# Patient Record
Sex: FEMALE | Race: WHITE | Employment: OTHER | URBAN - NONMETROPOLITAN AREA
[De-identification: names, ages, dates, MRNs, and addresses within clinical notes are randomized per-mention and may not be internally consistent; named-entity substitution may affect disease eponyms.]

---

## 2019-05-24 ENCOUNTER — OUTSIDE SERVICES (OUTPATIENT)
Dept: FAMILY MEDICINE CLINIC | Age: 84
End: 2019-05-24
Payer: MEDICARE

## 2019-05-24 VITALS
OXYGEN SATURATION: 93 % | DIASTOLIC BLOOD PRESSURE: 55 MMHG | SYSTOLIC BLOOD PRESSURE: 110 MMHG | TEMPERATURE: 97.7 F | WEIGHT: 181.8 LBS | RESPIRATION RATE: 18 BRPM | HEART RATE: 67 BPM

## 2019-05-24 DIAGNOSIS — I10 ESSENTIAL HYPERTENSION: Primary | ICD-10-CM

## 2019-05-24 DIAGNOSIS — M47.818 OSTEOARTHRITIS OF SPINE WITHOUT MYELOPATHY OR RADICULOPATHY, SACRAL AND SACROCOCCYGEAL REGION: ICD-10-CM

## 2019-05-24 DIAGNOSIS — E78.2 MIXED HYPERLIPIDEMIA: ICD-10-CM

## 2019-05-24 DIAGNOSIS — F32.89 OTHER DEPRESSION: ICD-10-CM

## 2019-05-24 DIAGNOSIS — I35.0 NONRHEUMATIC AORTIC VALVE STENOSIS: ICD-10-CM

## 2019-05-24 PROBLEM — F32.A DEPRESSION: Status: ACTIVE | Noted: 2019-05-24

## 2019-05-24 RX ORDER — NYSTATIN 100000 [USP'U]/G
POWDER TOPICAL 2 TIMES DAILY
COMMUNITY

## 2019-05-24 RX ORDER — DOCUSATE SODIUM 100 MG/1
100 CAPSULE, LIQUID FILLED ORAL 3 TIMES DAILY PRN
COMMUNITY
End: 2019-08-26

## 2019-05-24 RX ORDER — SENNA AND DOCUSATE SODIUM 50; 8.6 MG/1; MG/1
2 TABLET, FILM COATED ORAL DAILY PRN
COMMUNITY
End: 2019-08-26

## 2019-05-24 RX ORDER — ACETAMINOPHEN 325 MG/1
325 TABLET ORAL 2 TIMES DAILY
COMMUNITY

## 2019-05-24 RX ORDER — LISINOPRIL 40 MG/1
40 TABLET ORAL DAILY
COMMUNITY

## 2019-05-24 RX ORDER — COVID-19 ANTIGEN TEST
1 KIT MISCELLANEOUS DAILY PRN
COMMUNITY

## 2019-05-24 RX ORDER — SERTRALINE HYDROCHLORIDE 100 MG/1
50 TABLET, FILM COATED ORAL DAILY
COMMUNITY

## 2019-05-24 RX ORDER — MAGNESIUM HYDROXIDE/ALUMINUM HYDROXICE/SIMETHICONE 120; 1200; 1200 MG/30ML; MG/30ML; MG/30ML
30 SUSPENSION ORAL EVERY 4 HOURS PRN
COMMUNITY
End: 2019-08-26

## 2019-05-24 SDOH — HEALTH STABILITY: MENTAL HEALTH: HOW OFTEN DO YOU HAVE A DRINK CONTAINING ALCOHOL?: NEVER

## 2019-05-24 ASSESSMENT — ENCOUNTER SYMPTOMS
ABDOMINAL DISTENTION: 0
CHEST TIGHTNESS: 0
COUGH: 0
SORE THROAT: 0
NAUSEA: 0
DIARRHEA: 0
RHINORRHEA: 0
SHORTNESS OF BREATH: 0
WHEEZING: 0
VOMITING: 0
CONSTIPATION: 0
EYE REDNESS: 0

## 2019-05-24 NOTE — PROGRESS NOTES
Kalyn Dodge is a 80 y.o. female who presents today for her medical conditions/complaints as noted below. Chief Complaint   Patient presents with    Other     Quarterly visit for review of chronic health conditions    Hypertension           HPI:     HPI     Jason Ghosh is seen in her room. Her mood has declined recently with the passing of one of her friends. She remains in her room more often. She has had a recent increase in her Zoloft and we are continuing to monitor this. Will add Psych services to see if this will help her. She is pleasant and conversant this am. She denies pain. Smiles with this writer.      Past Medical History:   Diagnosis Date    Aortic valve stenosis     Dementia with behavioral disturbance     History of pelvic fracture     HLD (hyperlipidemia)     HTN (hypertension)     MDD (major depressive disorder)     Polyarthritis     Renal colic     VRE (vancomycin resistant enterococcus) culture positive       Past Surgical History:   Procedure Laterality Date    DILATION AND CURETTAGE OF UTERUS      KNEE SURGERY      TONSILLECTOMY         Family History   Problem Relation Age of Onset    Heart Disease Mother        Social History     Tobacco Use    Smoking status: Never Smoker    Smokeless tobacco: Never Used   Substance Use Topics    Alcohol use: Never     Frequency: Never      Current Outpatient Medications   Medication Sig Dispense Refill    acetaminophen (TYLENOL) 325 MG tablet Take 325 mg by mouth 2 times daily      Naproxen Sodium (ALEVE) 220 MG CAPS Take 1 tablet by mouth daily as needed for Pain      aspirin 81 MG tablet Take 81 mg by mouth daily      lisinopril (PRINIVIL;ZESTRIL) 40 MG tablet Take 40 mg by mouth daily      aluminum & magnesium hydroxide-simethicone (MAALOX) 200-200-20 MG/5ML SUSP suspension Take 30 mLs by mouth every 4 hours as needed for Indigestion      nystatin (MYCOSTATIN) 859190 UNIT/GM powder Apply topically 2 times daily Apply topically 4 times daily.      sennosides-docusate sodium (SENOKOT-S) 8.6-50 MG tablet Take 2 tablets by mouth daily as needed for Constipation      docusate sodium (COLACE) 100 MG capsule Take 100 mg by mouth 3 times daily as needed for Constipation      sertraline (ZOLOFT) 100 MG tablet Take 100 mg by mouth daily       No current facility-administered medications for this visit. No Known Allergies    Health Maintenance   Topic Date Due    Pneumococcal 65+ years Vaccine (1 of 2 - PCV13) 02/01/1996    Flu vaccine (Season Ended) 09/01/2019       Subjective:      Review of Systems   Constitutional: Positive for activity change, appetite change and fatigue. Negative for chills, fever and unexpected weight change. HENT: Positive for hearing loss. Negative for congestion, rhinorrhea and sore throat. Eyes: Positive for visual disturbance. Negative for redness. Respiratory: Negative for cough, chest tightness, shortness of breath and wheezing. Cardiovascular: Negative for chest pain and leg swelling. Gastrointestinal: Negative for abdominal distention, constipation, diarrhea, nausea and vomiting. Endocrine: Negative for polydipsia and polyuria. Genitourinary: Negative for dysuria, frequency and hematuria. Musculoskeletal: Positive for gait problem. Negative for arthralgias and myalgias. Skin: Negative for pallor, rash and wound. Allergic/Immunologic: Negative for environmental allergies and immunocompromised state. Neurological: Negative for dizziness, light-headedness and headaches. Hematological: Does not bruise/bleed easily. Psychiatric/Behavioral: Positive for confusion and dysphoric mood. Negative for sleep disturbance. The patient is not nervous/anxious. Objective:     Physical Exam   Constitutional: She appears well-developed and well-nourished. No distress. HENT:   Head: Normocephalic and atraumatic.    Right Ear: External ear normal.   Left Ear: External ear normal.   Nose: Nose normal. myelopathy or radiculopathy, sacral and sacrococcygeal region     5. Other depression         Plan:     1. HTN- Chronic and stable. Continue Lisinopril and monitor blood pressures. 2. Aortic stenosis- Will continue to monitor for s/s. States she is not having surgery. Denies dizziness. No falls and denies increased shortness of air except on exertion. Continue to monitor. Labs stable. 3. HLD- Chronic and stable. Statin discontinued. Continue Heart healthy diet and ASA. 4. OA with hx of fx- Family does not desire any further testing. Will continue to monitor. 5. Depression- Mood declining since she has had a friend pass away. Recent increase in zoloft. Will add Psychology services.       Electronically signed by NORBERTO Reyes CNP on 5/24/2019 at 6:12 PM

## 2019-08-26 ENCOUNTER — OUTSIDE SERVICES (OUTPATIENT)
Dept: FAMILY MEDICINE CLINIC | Age: 84
End: 2019-08-26
Payer: MEDICARE

## 2019-08-26 VITALS
HEART RATE: 62 BPM | SYSTOLIC BLOOD PRESSURE: 120 MMHG | RESPIRATION RATE: 16 BRPM | OXYGEN SATURATION: 95 % | WEIGHT: 137.7 LBS | TEMPERATURE: 97.9 F | DIASTOLIC BLOOD PRESSURE: 72 MMHG

## 2019-08-26 DIAGNOSIS — E78.2 MIXED HYPERLIPIDEMIA: ICD-10-CM

## 2019-08-26 DIAGNOSIS — I10 ESSENTIAL HYPERTENSION: Primary | ICD-10-CM

## 2019-08-26 DIAGNOSIS — F32.89 OTHER DEPRESSION: ICD-10-CM

## 2019-08-26 DIAGNOSIS — I35.0 NONRHEUMATIC AORTIC VALVE STENOSIS: ICD-10-CM

## 2019-08-26 DIAGNOSIS — M47.818 OSTEOARTHRITIS OF SPINE WITHOUT MYELOPATHY OR RADICULOPATHY, SACRAL AND SACROCOCCYGEAL REGION: ICD-10-CM

## 2019-08-26 RX ORDER — MIRTAZAPINE 15 MG/1
7.5 TABLET, FILM COATED ORAL NIGHTLY
COMMUNITY
End: 2019-10-28

## 2019-08-26 ASSESSMENT — ENCOUNTER SYMPTOMS
RHINORRHEA: 0
DIARRHEA: 0
WHEEZING: 0
EYE REDNESS: 0
SHORTNESS OF BREATH: 0
NAUSEA: 0
CONSTIPATION: 0
COUGH: 0
CHEST TIGHTNESS: 0
ABDOMINAL DISTENTION: 0
SORE THROAT: 0
VOMITING: 0

## 2019-08-26 NOTE — PROGRESS NOTES
is clear and moist and mucous membranes are normal.   Eyes: Conjunctivae and EOM are normal. Right eye exhibits no discharge. Left eye exhibits no discharge. No scleral icterus. Neck: Neck supple. No JVD present. No thyromegaly present. Cardiovascular: Normal rate and intact distal pulses. A regularly irregular rhythm present. Murmur heard. Pulmonary/Chest: Effort normal and breath sounds normal. No stridor. No respiratory distress. She has no wheezes. She has no rales. Abdominal: Soft. Bowel sounds are normal. She exhibits no distension. There is no tenderness. Musculoskeletal: Normal range of motion. She exhibits no edema or deformity. Right shoulder: She exhibits no tenderness, no bony tenderness and no pain. Left shoulder: She exhibits no tenderness, no bony tenderness and no pain. Cervical back: She exhibits no tenderness, no bony tenderness and no pain. Thoracic back: She exhibits no tenderness, no bony tenderness, no pain and no spasm. Lumbar back: She exhibits no tenderness, no bony tenderness and no pain. Lymphadenopathy:     She has no cervical adenopathy. Right: No supraclavicular adenopathy present. Left: No supraclavicular adenopathy present. Neurological: She is alert. She is disoriented (oriented to person and place). She displays no atrophy. She exhibits normal muscle tone. She displays no seizure activity. Skin: Skin is warm, dry and intact. No rash noted. No erythema. Psychiatric: She has a normal mood and affect. Her speech is normal and behavior is normal. Cognition and memory are impaired. She expresses impulsivity. Nursing note and vitals reviewed. /72   Pulse 62   Temp 97.9 °F (36.6 °C)   Resp 16   Wt 137 lb 11.2 oz (62.5 kg)   SpO2 95%     Assessment:       Diagnosis Orders   1. Essential hypertension     2. Nonrheumatic aortic valve stenosis     3. Mixed hyperlipidemia     4.  Osteoarthritis of spine without

## 2019-10-28 ENCOUNTER — OUTSIDE SERVICES (OUTPATIENT)
Dept: FAMILY MEDICINE CLINIC | Age: 84
End: 2019-10-28
Payer: MEDICARE

## 2019-10-28 VITALS
RESPIRATION RATE: 16 BRPM | DIASTOLIC BLOOD PRESSURE: 94 MMHG | OXYGEN SATURATION: 95 % | HEART RATE: 67 BPM | TEMPERATURE: 97.9 F | SYSTOLIC BLOOD PRESSURE: 161 MMHG | WEIGHT: 145.1 LBS

## 2019-10-28 DIAGNOSIS — R22.0 MANDIBULAR SWELLING: ICD-10-CM

## 2019-10-28 DIAGNOSIS — I10 ESSENTIAL HYPERTENSION: Primary | ICD-10-CM

## 2019-10-28 RX ORDER — AMLODIPINE BESYLATE 5 MG/1
5 TABLET ORAL DAILY
COMMUNITY

## 2019-10-28 ASSESSMENT — ENCOUNTER SYMPTOMS
NAUSEA: 0
CONSTIPATION: 0
VOMITING: 0
SHORTNESS OF BREATH: 0
SORE THROAT: 0
TROUBLE SWALLOWING: 0
COUGH: 0
SINUS PRESSURE: 0
RHINORRHEA: 0
FACIAL SWELLING: 1
WHEEZING: 0
DIARRHEA: 0

## 2020-01-01 ENCOUNTER — OUTSIDE SERVICES (OUTPATIENT)
Dept: FAMILY MEDICINE CLINIC | Age: 85
End: 2020-01-01
Payer: MEDICARE

## 2020-01-01 VITALS
SYSTOLIC BLOOD PRESSURE: 104 MMHG | DIASTOLIC BLOOD PRESSURE: 59 MMHG | RESPIRATION RATE: 16 BRPM | HEART RATE: 67 BPM | TEMPERATURE: 98 F | OXYGEN SATURATION: 96 % | WEIGHT: 140.6 LBS

## 2020-01-01 VITALS
WEIGHT: 137.4 LBS | OXYGEN SATURATION: 95 % | HEART RATE: 64 BPM | TEMPERATURE: 97.6 F | RESPIRATION RATE: 16 BRPM | DIASTOLIC BLOOD PRESSURE: 61 MMHG | SYSTOLIC BLOOD PRESSURE: 110 MMHG

## 2020-01-01 VITALS
RESPIRATION RATE: 18 BRPM | TEMPERATURE: 97.1 F | DIASTOLIC BLOOD PRESSURE: 74 MMHG | SYSTOLIC BLOOD PRESSURE: 135 MMHG | HEART RATE: 58 BPM | WEIGHT: 153.6 LBS | OXYGEN SATURATION: 98 %

## 2020-01-01 VITALS
SYSTOLIC BLOOD PRESSURE: 99 MMHG | HEART RATE: 75 BPM | RESPIRATION RATE: 16 BRPM | WEIGHT: 142.2 LBS | OXYGEN SATURATION: 94 % | TEMPERATURE: 97.7 F | DIASTOLIC BLOOD PRESSURE: 55 MMHG

## 2020-01-01 PROCEDURE — 99490 CHRNC CARE MGMT STAFF 1ST 20: CPT | Performed by: NURSE PRACTITIONER

## 2020-01-01 PROCEDURE — 99309 SBSQ NF CARE MODERATE MDM 30: CPT | Performed by: NURSE PRACTITIONER

## 2020-01-01 PROCEDURE — 99490 CHRNC CARE MGMT STAFF 1ST 20: CPT | Performed by: FAMILY MEDICINE

## 2020-01-01 PROCEDURE — 99306 1ST NF CARE HIGH MDM 50: CPT | Performed by: FAMILY MEDICINE

## 2020-01-01 RX ORDER — LORAZEPAM 0.5 MG/1
0.5 TABLET ORAL
Qty: 90 TABLET | Refills: 0 | Status: SHIPPED | OUTPATIENT
Start: 2020-01-01 | End: 2020-12-11

## 2020-01-01 ASSESSMENT — ENCOUNTER SYMPTOMS
NAUSEA: 0
CHEST TIGHTNESS: 0
RHINORRHEA: 0
EYE REDNESS: 0
ABDOMINAL DISTENTION: 0
SHORTNESS OF BREATH: 0
VOMITING: 0
COUGH: 0
WHEEZING: 0
CONSTIPATION: 0
SHORTNESS OF BREATH: 0
DIARRHEA: 0
SORE THROAT: 0

## 2020-06-22 PROBLEM — F03.918 DEMENTIA WITH BEHAVIORAL DISTURBANCE: Status: ACTIVE | Noted: 2020-01-01

## 2020-06-22 PROBLEM — N39.45 CONTINUOUS LEAKAGE OF URINE: Status: ACTIVE | Noted: 2020-01-01

## 2020-06-22 NOTE — PROGRESS NOTES
Out    Hepatitis B vaccine  Aged Out    Hib vaccine  Aged Out    Meningococcal (ACWY) vaccine  Aged Out       Subjective:      Review of Systems   Constitutional: Negative for chills, fatigue, fever and unexpected weight change. HENT: Positive for hearing loss. Negative for congestion, rhinorrhea and sore throat. Eyes: Positive for visual disturbance. Negative for redness. Respiratory: Negative for cough, chest tightness, shortness of breath and wheezing. Cardiovascular: Negative for chest pain and leg swelling. Gastrointestinal: Negative for abdominal distention, constipation, diarrhea, nausea and vomiting. Endocrine: Negative for polydipsia and polyuria. Genitourinary: Negative for dysuria, frequency and hematuria. Increased incontinence   Musculoskeletal: Positive for gait problem (ambulates with walker). Negative for arthralgias and myalgias. Skin: Negative for rash and wound. Denuded skin on buttocks   Allergic/Immunologic: Negative for environmental allergies and immunocompromised state. Neurological: Negative for dizziness, weakness, light-headedness and headaches. Hematological: Does not bruise/bleed easily. Psychiatric/Behavioral: Positive for confusion (increases in the evening). Negative for dysphoric mood and sleep disturbance. The patient is not nervous/anxious. Objective:     Physical Exam  Vitals signs and nursing note reviewed. Constitutional:       General: She is sleeping. She is not in acute distress. Appearance: She is well-developed. HENT:      Head: Normocephalic and atraumatic. Right Ear: External ear normal.      Left Ear: External ear normal.      Nose: Nose normal.   Eyes:      General: No scleral icterus. Right eye: No discharge. Left eye: No discharge. Conjunctiva/sclera: Conjunctivae normal.   Neck:      Musculoskeletal: Neck supple. Thyroid: No thyromegaly. Vascular: No JVD.    Cardiovascular:

## 2020-08-28 NOTE — PROGRESS NOTES
Edil Camarillo is a 80 y.o. female who presents today for her medical conditions/complaints as noted below. Chief Complaint   Patient presents with    Other     admission to nursing facility           HPI:     Patient is seen and examined in her room today for visit for admission to the long-term care facility. She was previously in the assisted living and has had overall functional decline. She has dementia with behavioral disturbances and has been having more behaviors and anger outburst.  She has become aggressive. Her appetite has decreased and she is become more impulsive. She did have 7 falls within a 2-day span in the assisted living and it was decided to send her to the long-term care side. Hospice had been consulted and she is now on hospice care. Today she is resting comfortably in her bed and has no complaints. She is pleasant and cooperative. She denies any chest pain or shortness of breath and has no other concerns today.       Past Medical History:   Diagnosis Date    Aortic valve stenosis     Dementia with behavioral disturbance (HCC)     History of pelvic fracture     HLD (hyperlipidemia)     HTN (hypertension)     MDD (major depressive disorder)     Polyarthritis     Renal colic     VRE (vancomycin resistant enterococcus) culture positive       Past Surgical History:   Procedure Laterality Date    DILATION AND CURETTAGE OF UTERUS      KNEE SURGERY      TONSILLECTOMY         Family History   Problem Relation Age of Onset    Heart Disease Mother        Social History     Tobacco Use    Smoking status: Never Smoker    Smokeless tobacco: Never Used   Substance Use Topics    Alcohol use: Never     Frequency: Never      No Known Allergies    Health Maintenance   Topic Date Due    Potassium monitoring  02/01/1931    Creatinine monitoring  02/01/1931    DTaP/Tdap/Td vaccine (1 - Tdap) 02/01/1950    Shingles Vaccine (1 of 2) 02/01/1981    Pneumococcal 65+ years Vaccine (1 of 1 - PPSV23) 02/01/1996    Flu vaccine (1) 09/01/2020    Hepatitis A vaccine  Aged Out    Hepatitis B vaccine  Aged Out    Hib vaccine  Aged Out    Meningococcal (ACWY) vaccine  Aged Out       Subjective:      Review of Systems   Unable to perform ROS: Dementia   Constitutional: Positive for activity change and appetite change. Respiratory: Negative for shortness of breath. Cardiovascular: Negative for leg swelling. Psychiatric/Behavioral: Positive for confusion. Negative for decreased concentration and dysphoric mood. Objective:     Physical Exam  Vitals signs and nursing note reviewed. Exam conducted with a chaperone present. Constitutional:       General: She is not in acute distress. Appearance: She is well-developed. She is ill-appearing. HENT:      Head: Normocephalic and atraumatic. Right Ear: External ear normal.      Left Ear: External ear normal.      Nose: Nose normal.   Eyes:      General: No scleral icterus. Right eye: No discharge. Left eye: No discharge. Conjunctiva/sclera: Conjunctivae normal.   Neck:      Musculoskeletal: Neck supple. Thyroid: No thyromegaly. Vascular: No JVD. Cardiovascular:      Rate and Rhythm: Normal rate and regular rhythm. Heart sounds: Murmur present. Pulmonary:      Effort: Pulmonary effort is normal. No respiratory distress. Breath sounds: Normal breath sounds. No stridor. No wheezing or rales. Abdominal:      General: Bowel sounds are normal. There is no distension. Palpations: Abdomen is soft. Tenderness: There is no abdominal tenderness. Musculoskeletal: Normal range of motion. General: No deformity. Right shoulder: She exhibits no tenderness, no bony tenderness and no pain. Left shoulder: She exhibits no tenderness, no bony tenderness and no pain. Cervical back: She exhibits no tenderness, no bony tenderness and no pain.       Thoracic back: She exhibits no meds  7. Hospice to eval and treat with a focus on comfort care. Resident has dementia, aortic stenosis, HLD, HTN, MDD and it is expected to last 15 or more months or until natural death. These chronic conditions place resident at a significant risk of death, acute exacerbation, or functional decline. The patient's comprehensive plan was monitored today. I spent 20 minutes reviewing plan. Patient seen and examined. History partially obtained by chart review and nursing notes. I have reviewed patient's past medical, surgical, social, and family history and have made updates where appropriate.   See facility EMR for updated medication list.       Electronically signed by Rebecca Crandall MD on 8/28/2020 at 11:53 AM

## 2020-09-21 PROBLEM — F33.41 RECURRENT MAJOR DEPRESSIVE DISORDER, IN PARTIAL REMISSION (HCC): Status: ACTIVE | Noted: 2020-01-01

## 2020-09-21 PROBLEM — Z51.5 HOSPICE CARE PATIENT: Status: ACTIVE | Noted: 2020-01-01

## 2020-09-21 NOTE — PROGRESS NOTES
Homa العراقي is a 80 y.o. female who presents today for her medical conditions/complaints as noted below. Chief Complaint   Patient presents with    1 Month Follow-Up     Follow up with chronic health conditions           HPI:     Hilario Dupont is seen today for her monthly visit. She is resting in her chair and does not arouse for her visit. She was in the assisted living and has been moved to the long-term side. She is currently on hospice. She does not appear to be in any pain. She has chronic health conditions aortic valve stenosis, dementia, frequent falls, hyperlipidemia, hypertension, major depression, arthritis, and anxiety. Her weight is down 7 pounds in the last month and she has had 1 fall. Her pain does appear to be relatively controlled with comfort medications.       Past Medical History:   Diagnosis Date    Aortic valve stenosis     Dementia with behavioral disturbance (HCC)     History of pelvic fracture     HLD (hyperlipidemia)     HTN (hypertension)     MDD (major depressive disorder)     Polyarthritis     Renal colic     VRE (vancomycin resistant enterococcus) culture positive       Past Surgical History:   Procedure Laterality Date    DILATION AND CURETTAGE OF UTERUS      KNEE SURGERY      TONSILLECTOMY         Family History   Problem Relation Age of Onset    Heart Disease Mother        Social History     Tobacco Use    Smoking status: Never Smoker    Smokeless tobacco: Never Used   Substance Use Topics    Alcohol use: Never     Frequency: Never      No Known Allergies    Health Maintenance   Topic Date Due    Potassium monitoring  02/01/1931    Creatinine monitoring  02/01/1931    DTaP/Tdap/Td vaccine (1 - Tdap) 02/01/1950    Shingles Vaccine (1 of 2) 02/01/1981    Pneumococcal 65+ years Vaccine (1 of 1 - PPSV23) 02/01/1996    Flu vaccine (1) 09/01/2020    Hepatitis A vaccine  Aged Out    Hepatitis B vaccine  Aged Out    Hib vaccine  Aged Out    Meningococcal (ACWY) vaccine  Aged Out       Subjective:      Review of Systems   Unable to perform ROS: Dementia       Objective:     Physical Exam  Vitals signs and nursing note reviewed. Constitutional:       General: She is sleeping. She is not in acute distress. Appearance: She is well-developed. HENT:      Head: Normocephalic and atraumatic. Right Ear: External ear normal.      Left Ear: External ear normal.      Nose: Nose normal.   Eyes:      General: No scleral icterus. Right eye: No discharge. Left eye: No discharge. Conjunctiva/sclera: Conjunctivae normal.   Neck:      Musculoskeletal: Neck supple. Thyroid: No thyromegaly. Vascular: No JVD. Cardiovascular:      Rate and Rhythm: Normal rate and regular rhythm. Heart sounds: Murmur present. Pulmonary:      Effort: Pulmonary effort is normal. No respiratory distress. Breath sounds: Normal breath sounds. No stridor. No wheezing or rales. Abdominal:      General: Bowel sounds are normal. There is no distension. Palpations: Abdomen is soft. Tenderness: There is no abdominal tenderness. Musculoskeletal: Normal range of motion. General: No deformity. Right shoulder: She exhibits no tenderness, no bony tenderness and no pain. Left shoulder: She exhibits no tenderness, no bony tenderness and no pain. Cervical back: She exhibits no tenderness, no bony tenderness and no pain. Thoracic back: She exhibits no tenderness, no bony tenderness, no pain and no spasm. Lumbar back: She exhibits no tenderness, no bony tenderness and no pain. Lymphadenopathy:      Cervical: No cervical adenopathy. Upper Body:      Right upper body: No supraclavicular adenopathy. Left upper body: No supraclavicular adenopathy. Skin:     General: Skin is warm and dry. Findings: No erythema or rash. Neurological:      Mental Status: She is oriented to person, place, and time.       Motor: No atrophy, abnormal muscle tone or seizure activity. Psychiatric:         Speech: She is noncommunicative. Behavior: Behavior normal.         Cognition and Memory: Memory is impaired. She exhibits impaired recent memory and impaired remote memory. /61   Pulse 64   Temp 97.6 °F (36.4 °C)   Resp 16   Wt 137 lb 6.4 oz (62.3 kg)   SpO2 95%     Assessment/Plan      1. Dementia with behavioral disturbance, unspecified dementia type ([facility] 75.) -continue current dose of Seroquel. Safety measures in place. Also on sertraline. Sleeping today. Continue to provide comfort measures. 2. Nonrheumatic aortic valve stenosis -1 fall in the past month. No signs and symptoms of dizziness. Continue to monitor. 3. Essential hypertension -blood pressure is lower today. Remains variable. Continue lisinopril. 4. Mixed hyperlipidemia -no longer on statin. Continue to monitor. 5. 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 Continue Tylenol. 6. Recurrent major depressive disorder, in partial remission ([facility] 75.) -continue current dose of sertraline and Zoloft. Monitor   7. Hospice care patient -continue to support. Resident has AS, dementia, HTN and it is expected to last 12 or more months. These chronic conditions place resident at a significant risk of death, acute exacerbation, or functional decline. The patient's comprehensive plan was revised and monitored today. I spent 20 minutes reviewing plan. Patient seen and examined. History partially obtained by chart review and nursing notes. I have reviewed patient's past medical, surgical, social, and family history and have made updates where appropriate.   See facility EMR for updated medication list.       Electronically signed by NORBERTO Welch CNP on 9/21/2020 at 2:09 PM

## 2020-10-26 NOTE — PROGRESS NOTES
Marco Simms is a 80 y.o. female who presents today for her medical conditions/complaints as noted below. Chief Complaint   Patient presents with    1 Month Follow-Up           HPI:     Maryanne Brunner is seen today for monthly visit for follow-up on her chronic diseases. She is up sitting in her bedside chair and states she is feeling fairly good. She does have chronic disease of dementia, aortic valve stenosis, frequent falls, hyperlipidemia, hypertension, major depression, arthritis, and anxiety. Her weight is overall stable in the last month and is up 1 pound in the last month she has had 1 fall in the last month. She denies pain today. She remains on hospice care and nursing has no new concerns today. She denies shortness of breath or chest pain today. Blood pressures are much lower in the last month and we will decrease her lisinopril today. Behaviors are overall controlled and we will do a mild decrease in her quetiapine. We will continue her Zoloft at current dose as she does not have any signs and symptoms of depression. Past Medical History:   Diagnosis Date    Aortic valve stenosis     Dementia with behavioral disturbance (HCC)     History of pelvic fracture     HLD (hyperlipidemia)     HTN (hypertension)     MDD (major depressive disorder)     Polyarthritis     Renal colic     VRE (vancomycin resistant enterococcus) culture positive       Past Surgical History:   Procedure Laterality Date    DILATION AND CURETTAGE OF UTERUS      KNEE SURGERY      TONSILLECTOMY         Family History   Problem Relation Age of Onset    Heart Disease Mother        Social History     Tobacco Use    Smoking status: Never Smoker    Smokeless tobacco: Never Used   Substance Use Topics    Alcohol use: Never     Frequency: Never      No Known Allergies    There are no preventive care reminders to display for this patient.     Subjective:      Review of Systems   Unable to perform ROS: Dementia Objective:     Physical Exam  Vitals signs and nursing note reviewed. Exam conducted with a chaperone present. Constitutional:       General: She is not in acute distress. Appearance: Normal appearance. She is well-developed and well-groomed. She is not ill-appearing. HENT:      Head: Normocephalic and atraumatic. Right Ear: Hearing normal.      Left Ear: Hearing normal.      Nose: Nose normal.   Eyes:      General: Lids are normal. Visual field deficit present. Neck:      Musculoskeletal: Normal range of motion and neck supple. Cardiovascular:      Rate and Rhythm: Normal rate. Rhythm irregular. Heart sounds: S1 normal and S2 normal. Murmur present. Pulmonary:      Effort: Pulmonary effort is normal. No tachypnea or bradypnea. Breath sounds: Normal breath sounds. Abdominal:      General: Abdomen is flat. Bowel sounds are normal.      Palpations: Abdomen is soft. Tenderness: There is no abdominal tenderness. Musculoskeletal:      Right lower leg: No edema. Left lower leg: No edema. Skin:     General: Skin is warm and dry. Neurological:      Mental Status: She is alert. Mental status is at baseline. She is disoriented. Psychiatric:         Speech: Speech is delayed. Cognition and Memory: Memory is impaired. She exhibits impaired recent memory and impaired remote memory. BP (!) 104/59   Pulse 67   Temp 98 °F (36.7 °C)   Resp 16   Wt 140 lb 9.6 oz (63.8 kg)   SpO2 96%     Assessment/Plan      1. Dementia with behavioral disturbance, unspecified dementia type ([facility] -Chronic and will decrease seroquel to 12.5 mg in am and continue current dose at hs. Declining overall. Weight stable. Continue zoloft and monitor depression. Monitor for falls. One fall in the last month. 2. Nonrheumatic aortic valve stenosis - one fall in the last month. No signs or symptoms noted. Continue to monitor. 3. Essential hypertension - Blood pressure lower.  Continue to monitor. Will decrease Lisinopril to 20 mg..    4. Mixed hyperlipidemia - no longer on statin. Continue to monitor. 5. 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 Continue to monitor. 6. Recurrent major depressive disorder, in partial remission (HCC) - Chronic and no mood indicators. Continue Zoloft. 7. Hospice care patient - Continue to support. Resident has AS, Dementia, HTN, HLD and it is expected to last 12 or more months. These chronic conditions place resident at a significant risk of death, acute exacerbation, or functional decline. The patient's comprehensive plan was revised and monitored today. I spent 20 minutes reviewing plan. Patient seen and examined. History partially obtained by chart review and nursing notes. I have reviewed patient's past medical, surgical, social, and family history and have made updates where appropriate.   See facility EMR for updated medication list.       Electronically signed by NORBERTO Saldana CNP on 10/26/2020 at 10:31 AM